# Patient Record
Sex: FEMALE | Race: WHITE | NOT HISPANIC OR LATINO | URBAN - METROPOLITAN AREA
[De-identification: names, ages, dates, MRNs, and addresses within clinical notes are randomized per-mention and may not be internally consistent; named-entity substitution may affect disease eponyms.]

---

## 2020-07-25 ENCOUNTER — TELEPHONE ENCOUNTER (OUTPATIENT)
Dept: URBAN - METROPOLITAN AREA CLINIC 13 | Facility: CLINIC | Age: 35
End: 2020-07-25

## 2020-07-25 RX ORDER — PANTOPRAZOLE SODIUM 40 MG
TAKE 1 TABLET BY MOUTH DAILY 30 MINUTES BEFORE BREAKFAST TABLET, DELAYED RELEASE (ENTERIC COATED) ORAL
Qty: 30 | Refills: 2 | OUTPATIENT
End: 2019-09-23

## 2020-07-25 RX ORDER — SERTRALINE HCL 25 MG
TAKE 1 TABLET DAILY TABLET ORAL
Refills: 0 | OUTPATIENT
End: 2016-03-04

## 2020-07-25 RX ORDER — LANSOPRAZOLE 30 MG/1
TAKE 1 CAPSULE DAILY PRN CAPSULE, DELAYED RELEASE ORAL
Refills: 0 | OUTPATIENT
End: 2016-03-04

## 2020-07-25 RX ORDER — ESOMEPRAZOLE MAGNESIUM 20 MG/1
TAKE 1 CAPSULE DAILY CAPSULE, DELAYED RELEASE ORAL
Refills: 0 | OUTPATIENT
End: 2015-09-10

## 2020-07-25 RX ORDER — CETIRIZINE HYDROCHLORIDE 10 MG/1
TAKE 1 CAPSULE DAILY CAPSULE, LIQUID FILLED ORAL
Refills: 0 | OUTPATIENT
End: 2016-03-04

## 2020-07-25 RX ORDER — FLUOXETINE HYDROCHLORIDE 20 MG/1
TAKE 1 TABLET DAILY TABLET ORAL
Refills: 0 | OUTPATIENT
End: 2018-08-17

## 2020-07-26 ENCOUNTER — TELEPHONE ENCOUNTER (OUTPATIENT)
Dept: URBAN - METROPOLITAN AREA CLINIC 13 | Facility: CLINIC | Age: 35
End: 2020-07-26

## 2020-07-26 RX ORDER — CEPHALEXIN 500 MG/1
CAPSULE ORAL
Qty: 28 | Refills: 0 | Status: ACTIVE | COMMUNITY
Start: 2019-09-04

## 2020-07-26 RX ORDER — AZITHROMYCIN DIHYDRATE 250 MG/1
TABLET, FILM COATED ORAL
Qty: 6 | Refills: 0 | Status: ACTIVE | COMMUNITY
Start: 2019-04-05

## 2020-07-26 RX ORDER — OSELTAMIVIR PHOSPHATE 75 MG/1
CAPSULE ORAL
Qty: 10 | Refills: 0 | Status: ACTIVE | COMMUNITY
Start: 2019-04-22

## 2020-07-26 RX ORDER — ALBUTEROL SULFATE 90 UG/1
AEROSOL, METERED RESPIRATORY (INHALATION)
Qty: 18 | Refills: 0 | Status: ACTIVE | COMMUNITY
Start: 2019-04-05

## 2020-07-26 RX ORDER — SERTRALINE 50 MG/1
TABLET, FILM COATED ORAL
Qty: 30 | Refills: 0 | Status: ACTIVE | COMMUNITY
Start: 2019-02-21

## 2020-07-26 RX ORDER — CEFDINIR 300 MG/1
CAPSULE ORAL
Qty: 20 | Refills: 0 | Status: ACTIVE | COMMUNITY
Start: 2018-12-11

## 2020-07-26 RX ORDER — ONDANSETRON 8 MG/1
TABLET ORAL
Qty: 15 | Refills: 0 | Status: ACTIVE | COMMUNITY
Start: 2019-04-22

## 2020-11-25 ENCOUNTER — WEB ENCOUNTER (OUTPATIENT)
Dept: URBAN - METROPOLITAN AREA CLINIC 113 | Facility: CLINIC | Age: 35
End: 2020-11-25

## 2020-11-25 ENCOUNTER — OFFICE VISIT (OUTPATIENT)
Dept: URBAN - METROPOLITAN AREA CLINIC 113 | Facility: CLINIC | Age: 35
End: 2020-11-25
Payer: COMMERCIAL

## 2020-11-25 VITALS
RESPIRATION RATE: 18 BRPM | BODY MASS INDEX: 32.65 KG/M2 | HEIGHT: 67 IN | TEMPERATURE: 98.1 F | DIASTOLIC BLOOD PRESSURE: 65 MMHG | HEART RATE: 75 BPM | SYSTOLIC BLOOD PRESSURE: 104 MMHG | WEIGHT: 208 LBS

## 2020-11-25 DIAGNOSIS — D51.0 PERNICIOUS ANEMIA: ICD-10-CM

## 2020-11-25 DIAGNOSIS — R10.11 RUQ PAIN: ICD-10-CM

## 2020-11-25 PROCEDURE — 99213 OFFICE O/P EST LOW 20 MIN: CPT | Performed by: INTERNAL MEDICINE

## 2020-11-25 PROCEDURE — G8427 DOCREV CUR MEDS BY ELIG CLIN: HCPCS | Performed by: INTERNAL MEDICINE

## 2020-11-25 RX ORDER — AZITHROMYCIN DIHYDRATE 250 MG/1
TABLET, FILM COATED ORAL
Qty: 6 | Refills: 0 | Status: DISCONTINUED | COMMUNITY
Start: 2019-04-05

## 2020-11-25 RX ORDER — CEFDINIR 300 MG/1
CAPSULE ORAL
Qty: 20 | Refills: 0 | Status: DISCONTINUED | COMMUNITY
Start: 2018-12-11

## 2020-11-25 RX ORDER — CETIRIZINE HYDROCHLORIDE 10 MG/1
1 TABLET TABLET, FILM COATED ORAL ONCE A DAY
Status: ACTIVE | COMMUNITY

## 2020-11-25 RX ORDER — OSELTAMIVIR PHOSPHATE 75 MG/1
CAPSULE ORAL
Qty: 10 | Refills: 0 | Status: DISCONTINUED | COMMUNITY
Start: 2019-04-22

## 2020-11-25 RX ORDER — SERTRALINE 50 MG/1
1 TABLET TABLET, FILM COATED ORAL ONCE A DAY
Refills: 0 | Status: DISCONTINUED | COMMUNITY
Start: 2019-02-21

## 2020-11-25 RX ORDER — ALBUTEROL SULFATE 90 UG/1
1 PUFF AS NEEDED AEROSOL, METERED RESPIRATORY (INHALATION)
Refills: 0 | Status: DISCONTINUED | COMMUNITY
Start: 2019-04-05

## 2020-11-25 RX ORDER — CEPHALEXIN 500 MG/1
CAPSULE ORAL
Qty: 28 | Refills: 0 | Status: DISCONTINUED | COMMUNITY
Start: 2019-09-04

## 2020-11-25 RX ORDER — ONDANSETRON 8 MG/1
TABLET ORAL
Qty: 15 | Refills: 0 | Status: DISCONTINUED | COMMUNITY
Start: 2019-04-22

## 2020-11-25 RX ORDER — MULTIVIT-MIN/FOLIC/VIT K/LYCOP 400-300MCG
1 TABLET TABLET ORAL ONCE A DAY
Status: ACTIVE | COMMUNITY

## 2020-11-25 NOTE — EXAM-PHYSICAL EXAM
She is alert and oriented to person place and situation no acute distress.  She has no scleral icterus. (4) no limitation

## 2020-11-25 NOTE — HPI-TODAY'S VISIT:
The patient is a very pleasant 35-year-old female who I seen in the past for abnormal LFTs and cholelithiasis.  She underwent cholecystectomy.  Work-up for abnormal LFTs did show an abnormal anti-smooth muscle antibody but her liver tests actually went to normal with weight loss on a keto diet.  Her last liver tests 1 year ago were normal.  She is now 21 weeks pregnant.  She states recently she started developing intermittent right upper quadrant pain primarily in a postprandial pattern.  It can be quite severe at times.  She is also worried as to whether she has developed severe B12 deficiency.  She has a long history of pernicious anemia with B12 deficiency.  She states she has not had a B12 shot in 1 year.  She states she started to have some of the nail changes and other physical changes that she has had with B12 deficiency in the past.

## 2020-12-08 ENCOUNTER — TELEPHONE ENCOUNTER (OUTPATIENT)
Dept: URBAN - METROPOLITAN AREA CLINIC 113 | Facility: CLINIC | Age: 35
End: 2020-12-08

## 2020-12-08 RX ORDER — CYANOCOBALAMIN 1000 UG/ML
1 ML INJECTION INTRAMUSCULAR; SUBCUTANEOUS WEEKLY
Qty: 6 | Refills: 3 | OUTPATIENT
Start: 2020-12-08 | End: 2021-12-03

## 2020-12-08 RX ORDER — SERTRALINE 50 MG/1
TABLET, FILM COATED ORAL
Qty: 30 | Refills: 0 | Status: ACTIVE | COMMUNITY
Start: 2019-02-21

## 2020-12-08 RX ORDER — OSELTAMIVIR PHOSPHATE 75 MG/1
CAPSULE ORAL
Qty: 10 | Refills: 0 | Status: ACTIVE | COMMUNITY
Start: 2019-04-22

## 2020-12-08 RX ORDER — ONDANSETRON 8 MG/1
TABLET ORAL
Qty: 15 | Refills: 0 | Status: ACTIVE | COMMUNITY
Start: 2019-04-22

## 2020-12-08 RX ORDER — ALBUTEROL SULFATE 90 UG/1
AEROSOL, METERED RESPIRATORY (INHALATION)
Qty: 18 | Refills: 0 | Status: ACTIVE | COMMUNITY
Start: 2019-04-05

## 2020-12-08 RX ORDER — AZITHROMYCIN DIHYDRATE 250 MG/1
TABLET, FILM COATED ORAL
Qty: 6 | Refills: 0 | Status: ACTIVE | COMMUNITY
Start: 2019-04-05

## 2020-12-08 RX ORDER — CEPHALEXIN 500 MG/1
CAPSULE ORAL
Qty: 28 | Refills: 0 | Status: ACTIVE | COMMUNITY
Start: 2019-09-04

## 2020-12-08 RX ORDER — CEFDINIR 300 MG/1
CAPSULE ORAL
Qty: 20 | Refills: 0 | Status: ACTIVE | COMMUNITY
Start: 2018-12-11

## 2021-02-02 ENCOUNTER — OFFICE VISIT (OUTPATIENT)
Dept: URBAN - METROPOLITAN AREA CLINIC 113 | Facility: CLINIC | Age: 36
End: 2021-02-02
Payer: COMMERCIAL

## 2021-02-02 VITALS
SYSTOLIC BLOOD PRESSURE: 97 MMHG | HEART RATE: 80 BPM | TEMPERATURE: 98 F | RESPIRATION RATE: 17 BRPM | BODY MASS INDEX: 36.26 KG/M2 | OXYGEN SATURATION: 98 % | HEIGHT: 67 IN | DIASTOLIC BLOOD PRESSURE: 68 MMHG | WEIGHT: 231 LBS

## 2021-02-02 DIAGNOSIS — D51.0 PERNICIOUS ANEMIA: ICD-10-CM

## 2021-02-02 DIAGNOSIS — R74.8 ABNORMAL AST AND ALT: ICD-10-CM

## 2021-02-02 DIAGNOSIS — K21.9 GASTROESOPHAGEAL REFLUX DISEASE, UNSPECIFIED WHETHER ESOPHAGITIS PRESENT: ICD-10-CM

## 2021-02-02 DIAGNOSIS — R10.11 RUQ PAIN: ICD-10-CM

## 2021-02-02 PROCEDURE — 99213 OFFICE O/P EST LOW 20 MIN: CPT | Performed by: INTERNAL MEDICINE

## 2021-02-02 PROCEDURE — G8427 DOCREV CUR MEDS BY ELIG CLIN: HCPCS | Performed by: INTERNAL MEDICINE

## 2021-02-02 RX ORDER — ALBUTEROL SULFATE 90 UG/1
AEROSOL, METERED RESPIRATORY (INHALATION)
Qty: 18 | Refills: 0 | Status: ACTIVE | COMMUNITY
Start: 2019-04-05

## 2021-02-02 RX ORDER — SERTRALINE 50 MG/1
TABLET, FILM COATED ORAL
Qty: 30 | Refills: 0 | Status: DISCONTINUED | COMMUNITY
Start: 2019-02-21

## 2021-02-02 RX ORDER — CETIRIZINE HYDROCHLORIDE 10 MG/1
1 TABLET TABLET, FILM COATED ORAL ONCE A DAY
Status: DISCONTINUED | COMMUNITY

## 2021-02-02 RX ORDER — ESCITALOPRAM OXALATE 10 MG/1
1 TABLET TABLET, FILM COATED ORAL ONCE A DAY
Status: ACTIVE | COMMUNITY

## 2021-02-02 RX ORDER — MULTIVIT-MIN/FOLIC/VIT K/LYCOP 400-300MCG
1 TABLET TABLET ORAL ONCE A DAY
Status: DISCONTINUED | COMMUNITY

## 2021-02-02 RX ORDER — CEFDINIR 300 MG/1
CAPSULE ORAL
Qty: 20 | Refills: 0 | Status: DISCONTINUED | COMMUNITY
Start: 2018-12-11

## 2021-02-02 RX ORDER — ONDANSETRON 8 MG/1
TABLET ORAL
Qty: 15 | Refills: 0 | Status: DISCONTINUED | COMMUNITY
Start: 2019-04-22

## 2021-02-02 RX ORDER — ESOMEPRAZOLE MAGNESIUM 20 MG/1
1 TABLET 1 HOUR BEFORE A MEAL TABLET ORAL ONCE A DAY
Status: ACTIVE | COMMUNITY

## 2021-02-02 RX ORDER — CYANOCOBALAMIN 1000 UG/ML
1 ML INJECTION INTRAMUSCULAR; SUBCUTANEOUS WEEKLY
Qty: 6 | Refills: 3 | Status: ACTIVE | COMMUNITY
Start: 2020-12-08 | End: 2021-12-03

## 2021-02-02 RX ORDER — OMEPRAZOLE 40 MG/1
1 CAPSULE 30 MINUTES BEFORE MORNING MEAL CAPSULE, DELAYED RELEASE ORAL ONCE A DAY
Qty: 90 | Refills: 3 | OUTPATIENT
Start: 2021-02-02

## 2021-02-02 RX ORDER — AZITHROMYCIN DIHYDRATE 250 MG/1
TABLET, FILM COATED ORAL
Qty: 6 | Refills: 0 | Status: DISCONTINUED | COMMUNITY
Start: 2019-04-05

## 2021-02-02 RX ORDER — CEPHALEXIN 500 MG/1
CAPSULE ORAL
Qty: 28 | Refills: 0 | Status: DISCONTINUED | COMMUNITY
Start: 2019-09-04

## 2021-02-02 RX ORDER — OSELTAMIVIR PHOSPHATE 75 MG/1
CAPSULE ORAL
Qty: 10 | Refills: 0 | Status: DISCONTINUED | COMMUNITY
Start: 2019-04-22

## 2021-02-02 NOTE — HPI-TODAY'S VISIT:
The patient is a very pleasant 35-year-old female who I seen in the past for abnormal LFTs and cholelithiasis.  She underwent cholecystectomy.  Work-up for abnormal LFTs did show an abnormal anti-smooth muscle antibody but her liver tests actually went to normal with weight loss on a keto diet.  Her last liver tests 1 year ago were normal.  She is now 21 weeks pregnant.  She states recently she started developing intermittent right upper quadrant pain primarily in a postprandial pattern.  It can be quite severe at times.  She is also worried as to whether she has developed severe B12 deficiency.  She has a long history of pernicious anemia with B12 deficiency.  She states she has not had a B12 shot in 1 year.  She states she started to have some of the nail changes and other physical changes that she has had with B12 deficiency in the past.  B12 level did come back markedly low at just over 200.  ALT was minimally elevated but other LFTs were normal.  She is receiving B12 shots and states her B12 level was recently checked and was approximately 350.  She states the right upper quadrant pain has indicated but she now has a probable waking up between midnight and 4 AM with severe heartburn and regurgitation all the way to the back of her throat often causing coughing.  There is severe sour taste to it.  She started over-the-counter Nexium and this has provided significant improvement but she still has some symptoms.  She states her obstetrician recently checked LFTs and they were normal.

## 2021-02-02 NOTE — EXAM-AA
she is alert and oriented to person place and situation in no acute distress.  She has no scleral icterus.  She is 31 weeks pregnant.

## 2023-03-10 ENCOUNTER — TELEPHONE ENCOUNTER (OUTPATIENT)
Dept: URBAN - METROPOLITAN AREA CLINIC 113 | Facility: CLINIC | Age: 38
End: 2023-03-10

## 2023-03-10 ENCOUNTER — OFFICE VISIT (OUTPATIENT)
Dept: URBAN - METROPOLITAN AREA CLINIC 107 | Facility: CLINIC | Age: 38
End: 2023-03-10
Payer: COMMERCIAL

## 2023-03-10 VITALS
DIASTOLIC BLOOD PRESSURE: 69 MMHG | HEIGHT: 67 IN | TEMPERATURE: 96.6 F | SYSTOLIC BLOOD PRESSURE: 101 MMHG | WEIGHT: 268 LBS | HEART RATE: 80 BPM | BODY MASS INDEX: 42.06 KG/M2

## 2023-03-10 DIAGNOSIS — R74.8 ABNORMAL AST AND ALT: ICD-10-CM

## 2023-03-10 DIAGNOSIS — K21.9 GASTROESOPHAGEAL REFLUX DISEASE, UNSPECIFIED WHETHER ESOPHAGITIS PRESENT: ICD-10-CM

## 2023-03-10 DIAGNOSIS — E53.8 B12 DEFICIENCY: ICD-10-CM

## 2023-03-10 DIAGNOSIS — D51.0 PERNICIOUS ANEMIA: ICD-10-CM

## 2023-03-10 DIAGNOSIS — R10.11 RUQ PAIN: ICD-10-CM

## 2023-03-10 PROBLEM — 84027009: Status: ACTIVE | Noted: 2020-11-25

## 2023-03-10 PROCEDURE — 99214 OFFICE O/P EST MOD 30 MIN: CPT | Performed by: INTERNAL MEDICINE

## 2023-03-10 RX ORDER — CYANOCOBALAMIN 1000 UG/ML
1 ML INJECTION INTRAMUSCULAR; SUBCUTANEOUS
Qty: 3 | Refills: 3 | OUTPATIENT
Start: 2023-03-14 | End: 2024-03-07

## 2023-03-10 RX ORDER — LEVOTHYROXINE SODIUM 25 UG/1
1 TABLET IN THE MORNING ON AN EMPTY STOMACH TABLET ORAL ONCE A DAY
Status: ACTIVE | COMMUNITY

## 2023-03-10 RX ORDER — ESCITALOPRAM OXALATE 20 MG/1
1 AND 1/2 PILLS TABLET, FILM COATED ORAL ONCE A DAY
Status: ACTIVE | COMMUNITY

## 2023-03-10 NOTE — HPI-TODAY'S VISIT:
The patient is a very pleasant 35-year-old female who I seen in the past for abnormal LFTs and cholelithiasis.  She underwent cholecystectomy.  Work-up for abnormal LFTs did show an abnormal anti-smooth muscle antibody but her liver tests actually went to normal with weight loss on a keto diet.  Her last liver tests 1 year ago were normal.  She is now 21 weeks pregnant.  She states recently she started developing intermittent right upper quadrant pain primarily in a postprandial pattern.  It can be quite severe at times.  She is also worried as to whether she has developed severe B12 deficiency.  She has a long history of pernicious anemia with B12 deficiency.  She states she has not had a B12 shot in 1 year.  She states she started to have some of the nail changes and other physical changes that she has had with B12 deficiency in the past.  B12 level did come back markedly low at just over 200.  ALT was minimally elevated but other LFTs were normal.  She is receiving B12 shots and states her B12 level was recently checked and was approximately 350.  She states the right upper quadrant pain has indicated but she now has a probable waking up between midnight and 4 AM with severe heartburn and regurgitation all the way to the back of her throat often causing coughing.  There is severe sour taste to it.  She started over-the-counter Nexium and this has provided significant improvement but she still has some symptoms.  She states her obstetrician recently checked LFTs and they were normal. Interval history, 3/10/2023.  Referring records were reviewed.  Laboratory testing from  of this month revealed a normal CBC.  CMP was also normal.  Of note back in August she did have a low hemoglobin of 8.9. Last upper endoscopy was 2016 for follow-up of gastric polyps.  This was an otherwise unremarkable exam.  Biopsies at that time were unremarkable with no evidence for H. pylori. 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  In the interim she became pregnant again and now has a young child.  She states the reason she was in the emergency room the other day she has been evaluated for episodes of syncope that tend to come on during stress.  She has no gastrointestinal complaints at this time.  She states she has been off B12 shots for a year.  She states most recent B12 level was down to 500.

## 2024-01-11 ENCOUNTER — OFFICE VISIT (OUTPATIENT)
Dept: URBAN - METROPOLITAN AREA CLINIC 113 | Facility: CLINIC | Age: 39
End: 2024-01-11

## 2024-03-20 ENCOUNTER — OV EP (OUTPATIENT)
Dept: URBAN - METROPOLITAN AREA CLINIC 107 | Facility: CLINIC | Age: 39
End: 2024-03-20
Payer: COMMERCIAL

## 2024-03-20 VITALS
DIASTOLIC BLOOD PRESSURE: 81 MMHG | TEMPERATURE: 97 F | BODY MASS INDEX: 36.26 KG/M2 | WEIGHT: 231 LBS | SYSTOLIC BLOOD PRESSURE: 115 MMHG | HEART RATE: 89 BPM | HEIGHT: 67 IN

## 2024-03-20 DIAGNOSIS — R74.8 ABNORMAL AST AND ALT: ICD-10-CM

## 2024-03-20 DIAGNOSIS — K21.9 GASTROESOPHAGEAL REFLUX DISEASE, UNSPECIFIED WHETHER ESOPHAGITIS PRESENT: ICD-10-CM

## 2024-03-20 DIAGNOSIS — R10.11 RUQ PAIN: ICD-10-CM

## 2024-03-20 DIAGNOSIS — E53.8 B12 DEFICIENCY: ICD-10-CM

## 2024-03-20 DIAGNOSIS — D51.0 PERNICIOUS ANEMIA: ICD-10-CM

## 2024-03-20 PROCEDURE — 99214 OFFICE O/P EST MOD 30 MIN: CPT | Performed by: INTERNAL MEDICINE

## 2024-03-20 RX ORDER — DEXTROAMPHETAMINE SACCHARATE, AMPHETAMINE ASPARTATE MONOHYDRATE, DEXTROAMPHETAMINE SULFATE, AND AMPHETAMINE SULFATE 5; 5; 5; 5 MG/1; MG/1; MG/1; MG/1
CAPSULE, EXTENDED RELEASE ORAL
Qty: 60 CAPSULE | Status: ACTIVE | COMMUNITY

## 2024-03-20 RX ORDER — ATENOLOL 25 MG/1
TABLET ORAL
Qty: 90 EACH | Refills: 3 | Status: ACTIVE | COMMUNITY

## 2024-03-20 RX ORDER — LEVOTHYROXINE SODIUM 25 UG/1
1 TABLET IN THE MORNING ON AN EMPTY STOMACH TABLET ORAL ONCE A DAY
Status: ACTIVE | COMMUNITY

## 2024-03-20 RX ORDER — DEXTROAMPHETAMINE SULFATE, DEXTROAMPHETAMINE SACCHARATE, AMPHETAMINE SULFATE AND AMPHETAMINE ASPARTATE 7.5; 7.5; 7.5; 7.5 MG/1; MG/1; MG/1; MG/1
CAPSULE, EXTENDED RELEASE ORAL
Qty: 30 CAPSULE | Status: ACTIVE | COMMUNITY

## 2024-03-20 RX ORDER — OMEPRAZOLE 40 MG/1
1 CAPSULE 30 MINUTES BEFORE MORNING MEAL CAPSULE, DELAYED RELEASE ORAL ONCE A DAY
Qty: 90 | Refills: 3 | OUTPATIENT
Start: 2024-03-20

## 2024-03-20 RX ORDER — AMITRIPTYLINE HYDROCHLORIDE 25 MG/1
1 TABLET AT BEDTIME TABLET, FILM COATED ORAL ONCE A DAY
Qty: 30 TABLET | Refills: 4 | OUTPATIENT
Start: 2024-03-20

## 2024-03-20 RX ORDER — CLONAZEPAM 0.5 MG/1
TABLET ORAL
Qty: 30 TABLET | Status: ACTIVE | COMMUNITY

## 2024-03-20 RX ORDER — ESCITALOPRAM OXALATE 20 MG/1
1 AND 1/2 PILLS TABLET, FILM COATED ORAL ONCE A DAY
Status: ON HOLD | COMMUNITY

## 2024-03-20 RX ORDER — ONDANSETRON HYDROCHLORIDE 4 MG/1
TABLET, FILM COATED ORAL
Qty: 20 EACH | Refills: 0 | Status: ACTIVE | COMMUNITY

## 2024-03-20 RX ORDER — IBUPROFEN 800 MG/1
TABLET ORAL
Qty: 30 TABLET | Status: ACTIVE | COMMUNITY

## 2024-03-20 RX ORDER — DICYCLOMINE HYDROCHLORIDE 10 MG/1
CAPSULE ORAL
Qty: 20 CAPSULE | Status: ACTIVE | COMMUNITY

## 2024-03-20 NOTE — HPI-TODAY'S VISIT:
The patient is a very pleasant 35-year-old female who I seen in the past for abnormal LFTs and cholelithiasis.  She underwent cholecystectomy.  Work-up for abnormal LFTs did show an abnormal anti-smooth muscle antibody but her liver tests actually went to normal with weight loss on a keto diet.  Her last liver tests 1 year ago were normal.  She is now 21 weeks pregnant.  She states recently she started developing intermittent right upper quadrant pain primarily in a postprandial pattern.  It can be quite severe at times.  She is also worried as to whether she has developed severe B12 deficiency.  She has a long history of pernicious anemia with B12 deficiency.  She states she has not had a B12 shot in 1 year.  She states she started to have some of the nail changes and other physical changes that she has had with B12 deficiency in the past.  B12 level did come back markedly low at just over 200.  ALT was minimally elevated but other LFTs were normal.  She is receiving B12 shots and states her B12 level was recently checked and was approximately 350.  She states the right upper quadrant pain has indicated but she now has a probable waking up between midnight and 4 AM with severe heartburn and regurgitation all the way to the back of her throat often causing coughing.  There is severe sour taste to it.  She started over-the-counter Nexium and this has provided significant improvement but she still has some symptoms.  She states her obstetrician recently checked LFTs and they were normal. Interval history, 3/10/2023.  Referring records were reviewed.  Laboratory testing from  of this month revealed a normal CBC.  CMP was also normal.  Of note back in August she did have a low hemoglobin of 8.9. Last upper endoscopy was 2016 for follow-up of gastric polyps.  This was an otherwise unremarkable exam.  Biopsies at that time were unremarkable with no evidence for H. pylori. 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  In the interim she became pregnant again and now has a young child.  She states the reason she was in the emergency room the other day she has been evaluated for episodes of syncope that tend to come on during stress.  She has no gastrointestinal complaints at this time.  She states she has been off B12 shots for a year.  She states most recent B12 level was down to 500. Interval history, 3/20/2024.  Laboratory testing from 2023 revealed a normal intrinsic factor antibody level. The patient states that since I last saw her she was started on a GLP-1 blocker and this led to severe gastrointestinal complaints.  She states also her psychiatrist has been working on changing her medications and in fact put her through a medication holiday.  She is thought to have PTSD related to the death of her second child.  She admits that she has been noncompliant with fiber and with her B12 shots. She states she was recently in the [facility] ER for the symptomatologies and had blood work and a CT scan performed which we will obtain those results.

## 2024-06-03 ENCOUNTER — DASHBOARD ENCOUNTERS (OUTPATIENT)
Age: 39
End: 2024-06-03

## 2024-06-03 ENCOUNTER — OFFICE VISIT (OUTPATIENT)
Dept: URBAN - METROPOLITAN AREA CLINIC 107 | Facility: CLINIC | Age: 39
End: 2024-06-03
Payer: COMMERCIAL

## 2024-06-03 VITALS
HEART RATE: 69 BPM | TEMPERATURE: 98.1 F | BODY MASS INDEX: 36.76 KG/M2 | DIASTOLIC BLOOD PRESSURE: 76 MMHG | SYSTOLIC BLOOD PRESSURE: 98 MMHG | HEIGHT: 67 IN | WEIGHT: 234.2 LBS

## 2024-06-03 DIAGNOSIS — E53.8 B12 DEFICIENCY: ICD-10-CM

## 2024-06-03 DIAGNOSIS — R10.11 RUQ PAIN: ICD-10-CM

## 2024-06-03 DIAGNOSIS — R74.8 ABNORMAL AST AND ALT: ICD-10-CM

## 2024-06-03 DIAGNOSIS — K58.1 IRRITABLE BOWEL SYNDROME WITH CONSTIPATION: ICD-10-CM

## 2024-06-03 DIAGNOSIS — K21.9 GASTROESOPHAGEAL REFLUX DISEASE, UNSPECIFIED WHETHER ESOPHAGITIS PRESENT: ICD-10-CM

## 2024-06-03 DIAGNOSIS — D51.0 PERNICIOUS ANEMIA: ICD-10-CM

## 2024-06-03 PROBLEM — 440630006: Status: ACTIVE | Noted: 2024-06-03

## 2024-06-03 PROCEDURE — 99214 OFFICE O/P EST MOD 30 MIN: CPT | Performed by: INTERNAL MEDICINE

## 2024-06-03 RX ORDER — CLONAZEPAM 0.5 MG/1
TABLET ORAL
Qty: 30 TABLET | Status: ACTIVE | COMMUNITY

## 2024-06-03 RX ORDER — IBUPROFEN 800 MG/1
TABLET ORAL
Qty: 30 TABLET | Status: ACTIVE | COMMUNITY

## 2024-06-03 RX ORDER — ATENOLOL 25 MG/1
TABLET ORAL
Qty: 90 EACH | Refills: 3 | Status: ACTIVE | COMMUNITY

## 2024-06-03 RX ORDER — OMEPRAZOLE 40 MG/1
1 CAPSULE 30 MINUTES BEFORE MORNING MEAL CAPSULE, DELAYED RELEASE ORAL ONCE A DAY
Qty: 90 | Refills: 3 | Status: ACTIVE | COMMUNITY
Start: 2024-03-20

## 2024-06-03 RX ORDER — AMITRIPTYLINE HYDROCHLORIDE 25 MG/1
1 TABLET AT BEDTIME TABLET, FILM COATED ORAL ONCE A DAY
Qty: 30 TABLET | Refills: 4 | Status: ACTIVE | COMMUNITY
Start: 2024-03-20

## 2024-06-03 RX ORDER — ONDANSETRON HYDROCHLORIDE 4 MG/1
TABLET, FILM COATED ORAL
Qty: 20 EACH | Refills: 0 | Status: ON HOLD | COMMUNITY

## 2024-06-03 RX ORDER — DEXTROAMPHETAMINE SACCHARATE, AMPHETAMINE ASPARTATE MONOHYDRATE, DEXTROAMPHETAMINE SULFATE, AND AMPHETAMINE SULFATE 5; 5; 5; 5 MG/1; MG/1; MG/1; MG/1
CAPSULE, EXTENDED RELEASE ORAL
Qty: 60 CAPSULE | Status: ON HOLD | COMMUNITY

## 2024-06-03 RX ORDER — LEVOTHYROXINE SODIUM 25 UG/1
1 TABLET IN THE MORNING ON AN EMPTY STOMACH TABLET ORAL ONCE A DAY
Status: ACTIVE | COMMUNITY

## 2024-06-03 RX ORDER — DEXTROAMPHETAMINE SULFATE, DEXTROAMPHETAMINE SACCHARATE, AMPHETAMINE SULFATE AND AMPHETAMINE ASPARTATE 7.5; 7.5; 7.5; 7.5 MG/1; MG/1; MG/1; MG/1
CAPSULE, EXTENDED RELEASE ORAL
Qty: 30 CAPSULE | Status: ACTIVE | COMMUNITY

## 2024-06-03 RX ORDER — PLECANATIDE 3 MG/1
1 TABLET TABLET ORAL ONCE A DAY
Qty: 90 TABLET | Refills: 3 | OUTPATIENT
Start: 2024-06-03 | End: 2025-05-29

## 2024-06-03 RX ORDER — ESCITALOPRAM OXALATE 20 MG/1
1 AND 1/2 PILLS TABLET, FILM COATED ORAL ONCE A DAY
Status: ON HOLD | COMMUNITY

## 2024-06-03 RX ORDER — DICYCLOMINE HYDROCHLORIDE 10 MG/1
CAPSULE ORAL
Qty: 20 CAPSULE | Status: ON HOLD | COMMUNITY

## 2024-06-10 ENCOUNTER — TELEPHONE ENCOUNTER (OUTPATIENT)
Dept: URBAN - METROPOLITAN AREA CLINIC 107 | Facility: CLINIC | Age: 39
End: 2024-06-10

## 2024-06-10 RX ORDER — LUBIPROSTONE 24 UG/1
1 CAPSULE CAPSULE, GELATIN COATED ORAL TWICE A DAY
Qty: 180 CAPSULE | Refills: 3 | OUTPATIENT
Start: 2024-06-25 | End: 2025-06-20

## 2024-06-10 RX ORDER — LINACLOTIDE 145 UG/1
1 CAPSULE AT LEAST 30 MINUTES BEFORE THE FIRST MEAL OF THE DAY ON AN EMPTY STOMACH CAPSULE, GELATIN COATED ORAL ONCE A DAY
Qty: 90 | Refills: 0 | OUTPATIENT
Start: 2024-06-19 | End: 2024-09-17

## 2024-06-27 ENCOUNTER — TELEPHONE ENCOUNTER (OUTPATIENT)
Dept: URBAN - METROPOLITAN AREA CLINIC 113 | Facility: CLINIC | Age: 39
End: 2024-06-27

## 2024-06-27 RX ORDER — LINACLOTIDE 145 UG/1
1 CAPSULE AT LEAST 30 MINUTES BEFORE THE FIRST MEAL OF THE DAY ON AN EMPTY STOMACH CAPSULE, GELATIN COATED ORAL ONCE A DAY
Qty: 90 | Refills: 0
Start: 2024-06-19 | End: 2024-09-25

## 2024-07-01 ENCOUNTER — TELEPHONE ENCOUNTER (OUTPATIENT)
Dept: URBAN - METROPOLITAN AREA CLINIC 113 | Facility: CLINIC | Age: 39
End: 2024-07-01

## 2024-07-08 ENCOUNTER — TELEPHONE ENCOUNTER (OUTPATIENT)
Dept: URBAN - METROPOLITAN AREA CLINIC 113 | Facility: CLINIC | Age: 39
End: 2024-07-08

## 2024-08-31 ENCOUNTER — ERX REFILL RESPONSE (OUTPATIENT)
Dept: URBAN - METROPOLITAN AREA CLINIC 113 | Facility: CLINIC | Age: 39
End: 2024-08-31

## 2024-08-31 RX ORDER — AMITRIPTYLINE HYDROCHLORIDE 25 MG/1
TAKE 1 TABLET BY MOUTH DAILY AT BEDTIME TABLET, FILM COATED ORAL
Qty: 30 TABLET | Refills: 8 | OUTPATIENT

## 2024-08-31 RX ORDER — AMITRIPTYLINE HYDROCHLORIDE 25 MG/1
1 TABLET AT BEDTIME TABLET, FILM COATED ORAL ONCE A DAY
Qty: 30 TABLET | Refills: 4 | OUTPATIENT

## 2024-11-01 ENCOUNTER — OFFICE VISIT (OUTPATIENT)
Dept: URBAN - METROPOLITAN AREA CLINIC 107 | Facility: CLINIC | Age: 39
End: 2024-11-01
Payer: COMMERCIAL

## 2024-11-01 ENCOUNTER — LAB OUTSIDE AN ENCOUNTER (OUTPATIENT)
Dept: URBAN - METROPOLITAN AREA CLINIC 107 | Facility: CLINIC | Age: 39
End: 2024-11-01

## 2024-11-01 VITALS
HEART RATE: 75 BPM | WEIGHT: 229.2 LBS | TEMPERATURE: 97.9 F | BODY MASS INDEX: 35.97 KG/M2 | HEIGHT: 67 IN | SYSTOLIC BLOOD PRESSURE: 118 MMHG | DIASTOLIC BLOOD PRESSURE: 80 MMHG

## 2024-11-01 DIAGNOSIS — R10.11 RUQ PAIN: ICD-10-CM

## 2024-11-01 DIAGNOSIS — E53.8 B12 DEFICIENCY: ICD-10-CM

## 2024-11-01 DIAGNOSIS — K21.9 GASTROESOPHAGEAL REFLUX DISEASE, UNSPECIFIED WHETHER ESOPHAGITIS PRESENT: ICD-10-CM

## 2024-11-01 DIAGNOSIS — K58.1 IRRITABLE BOWEL SYNDROME WITH CONSTIPATION: ICD-10-CM

## 2024-11-01 DIAGNOSIS — D51.0 PERNICIOUS ANEMIA: ICD-10-CM

## 2024-11-01 DIAGNOSIS — R19.4 CHANGE IN BOWEL HABITS: ICD-10-CM

## 2024-11-01 DIAGNOSIS — R74.8 ABNORMAL AST AND ALT: ICD-10-CM

## 2024-11-01 PROCEDURE — 99214 OFFICE O/P EST MOD 30 MIN: CPT | Performed by: INTERNAL MEDICINE

## 2024-11-01 RX ORDER — PLECANATIDE 3 MG/1
1 TABLET TABLET ORAL ONCE A DAY
Qty: 90 TABLET | Refills: 3 | Status: ACTIVE | COMMUNITY
Start: 2024-06-03 | End: 2025-05-29

## 2024-11-01 RX ORDER — AMITRIPTYLINE HYDROCHLORIDE 25 MG/1
TAKE 1 TABLET BY MOUTH DAILY AT BEDTIME TABLET, FILM COATED ORAL
Qty: 30 TABLET | Refills: 8 | Status: ACTIVE | COMMUNITY

## 2024-11-01 RX ORDER — CLONAZEPAM 0.5 MG/1
TABLET ORAL
Qty: 30 TABLET | Status: ACTIVE | COMMUNITY

## 2024-11-01 RX ORDER — DEXTROAMPHETAMINE SULFATE, DEXTROAMPHETAMINE SACCHARATE, AMPHETAMINE SULFATE AND AMPHETAMINE ASPARTATE 7.5; 7.5; 7.5; 7.5 MG/1; MG/1; MG/1; MG/1
CAPSULE, EXTENDED RELEASE ORAL
Qty: 30 CAPSULE | Status: ACTIVE | COMMUNITY

## 2024-11-01 RX ORDER — ONDANSETRON HYDROCHLORIDE 4 MG/1
TABLET, FILM COATED ORAL
Qty: 20 EACH | Refills: 0 | Status: ON HOLD | COMMUNITY

## 2024-11-01 RX ORDER — LEVOTHYROXINE SODIUM 25 UG/1
1 TABLET IN THE MORNING ON AN EMPTY STOMACH TABLET ORAL ONCE A DAY
Status: ON HOLD | COMMUNITY

## 2024-11-01 RX ORDER — DEXTROAMPHETAMINE SACCHARATE, AMPHETAMINE ASPARTATE MONOHYDRATE, DEXTROAMPHETAMINE SULFATE, AND AMPHETAMINE SULFATE 5; 5; 5; 5 MG/1; MG/1; MG/1; MG/1
CAPSULE, EXTENDED RELEASE ORAL
Qty: 60 CAPSULE | Status: ON HOLD | COMMUNITY

## 2024-11-01 RX ORDER — ATENOLOL 25 MG/1
TABLET ORAL
Qty: 90 EACH | Refills: 3 | Status: ACTIVE | COMMUNITY

## 2024-11-01 RX ORDER — ESCITALOPRAM OXALATE 20 MG/1
1 AND 1/2 PILLS TABLET, FILM COATED ORAL ONCE A DAY
Status: ON HOLD | COMMUNITY

## 2024-11-01 RX ORDER — LUBIPROSTONE 24 UG/1
1 CAPSULE CAPSULE, GELATIN COATED ORAL TWICE A DAY
Qty: 180 CAPSULE | Refills: 3 | Status: ACTIVE | COMMUNITY
Start: 2024-06-25 | End: 2025-06-20

## 2024-11-01 RX ORDER — DICYCLOMINE HYDROCHLORIDE 10 MG/1
CAPSULE ORAL
Qty: 20 CAPSULE | Status: ON HOLD | COMMUNITY

## 2024-11-01 RX ORDER — OMEPRAZOLE 20 MG/1
2 CAPSULE 30 MINUTES BEFORE MORNING MEAL CAPSULE, DELAYED RELEASE ORAL ONCE A DAY
Refills: 3 | Status: ACTIVE | COMMUNITY
Start: 2024-03-20

## 2024-11-01 RX ORDER — LEVOTHYROXINE, LIOTHYRONINE 19; 4.5 UG/1; UG/1
1 TABLET ON AN EMPTY STOMACH TABLET ORAL ONCE A DAY
Status: ACTIVE | COMMUNITY

## 2024-11-01 RX ORDER — IBUPROFEN 800 MG/1
TABLET ORAL
Qty: 30 TABLET | Status: ACTIVE | COMMUNITY

## 2024-11-01 NOTE — HPI-TODAY'S VISIT:
The patient is a very pleasant 35-year-old female who I seen in the past for abnormal LFTs and cholelithiasis.  She underwent cholecystectomy.  Work-up for abnormal LFTs did show an abnormal anti-smooth muscle antibody but her liver tests actually went to normal with weight loss on a keto diet.  Her last liver tests 1 year ago were normal.  She is now 21 weeks pregnant.  She states recently she started developing intermittent right upper quadrant pain primarily in a postprandial pattern.  It can be quite severe at times.  She is also worried as to whether she has developed severe B12 deficiency.  She has a long history of pernicious anemia with B12 deficiency.  She states she has not had a B12 shot in 1 year.  She states she started to have some of the nail changes and other physical changes that she has had with B12 deficiency in the past.  B12 level did come back markedly low at just over 200.  ALT was minimally elevated but other LFTs were normal.  She is receiving B12 shots and states her B12 level was recently checked and was approximately 350.  She states the right upper quadrant pain has indicated but she now has a probable waking up between midnight and 4 AM with severe heartburn and regurgitation all the way to the back of her throat often causing coughing.  There is severe sour taste to it.  She started over-the-counter Nexium and this has provided significant improvement but she still has some symptoms.  She states her obstetrician recently checked LFTs and they were normal. Interval history, 3/10/2023.  Referring records were reviewed.  Laboratory testing from  of this month revealed a normal CBC.  CMP was also normal.  Of note back in August she did have a low hemoglobin of 8.9. Last upper endoscopy was 2016 for follow-up of gastric polyps.  This was an otherwise unremarkable exam.  Biopsies at that time were unremarkable with no evidence for H. pylori. 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  In the interim she became pregnant again and now has a young child.  She states the reason she was in the emergency room the other day she has been evaluated for episodes of syncope that tend to come on during stress.  She has no gastrointestinal complaints at this time.  She states she has been off B12 shots for a year.  She states most recent B12 level was down to 500. Interval history, 3/20/2024.  Laboratory testing from 2023 revealed a normal intrinsic factor antibody level. The patient states that since I last saw her she was started on a GLP-1 blocker and this led to severe gastrointestinal complaints.  She states also her psychiatrist has been working on changing her medications and in fact put her through a medication holiday.  She is thought to have PTSD related to the death of her second child.  She admits that she has been noncompliant with fiber and with her B12 shots. She states she was recently in the [facility] ER for the symptomatologies and had blood work and a CT scan performed which we will obtain those results. Interval history, 6/3/2024.  Outside emergency room records from February were reviewed.  This revealed a normal CBC, normal CMP, CT scan of the abdomen and pelvis with contrast which was unremarkable. The patient states that the amitriptyline was only minimally effective.  She still continues to have a pattern of bloating developing a sense of constipation with no bowel movements and then a "blowout" with multiple bowel movements that are quite muddy.  She admits that she has been somewhat noncompliant with her B12 shots.  She has a couple still left at home.  She states she still continues to have severe problems of anxiety depression and PTSD issues with fighter flight issues.  She is working with her psychiatrist closely to try and improve the symptomatology. Interval history, 2024. The patient continues to have the symptoms of erratic bowel movements.  She does state that she has fear of having bowel movements at school if she does not wish to use the children's bathrooms.  She is taking a fiber supplement with some marginal improvement at best.

## 2024-11-14 ENCOUNTER — TELEPHONE ENCOUNTER (OUTPATIENT)
Dept: URBAN - METROPOLITAN AREA CLINIC 107 | Facility: CLINIC | Age: 39
End: 2024-11-14

## 2024-11-25 ENCOUNTER — OFFICE VISIT (OUTPATIENT)
Dept: URBAN - METROPOLITAN AREA MEDICAL CENTER 2 | Facility: MEDICAL CENTER | Age: 39
End: 2024-11-25

## 2024-12-10 ENCOUNTER — TELEPHONE ENCOUNTER (OUTPATIENT)
Dept: URBAN - METROPOLITAN AREA CLINIC 107 | Facility: CLINIC | Age: 39
End: 2024-12-10

## 2024-12-16 ENCOUNTER — OFFICE VISIT (OUTPATIENT)
Dept: URBAN - METROPOLITAN AREA SURGERY CENTER 25 | Facility: SURGERY CENTER | Age: 39
End: 2024-12-16
Payer: COMMERCIAL

## 2024-12-16 DIAGNOSIS — R19.4 CHANGE IN BOWEL HABITS: ICD-10-CM

## 2024-12-16 DIAGNOSIS — K63.89 OTHER SPECIFIED DISEASES OF INTESTINE: ICD-10-CM

## 2024-12-16 DIAGNOSIS — R19.4 CHANGE IN BOWEL HABIT: ICD-10-CM

## 2024-12-16 PROCEDURE — 45378 DIAGNOSTIC COLONOSCOPY: CPT | Performed by: INTERNAL MEDICINE

## 2024-12-16 PROCEDURE — 00811 ANES LWR INTST NDSC NOS: CPT | Performed by: ANESTHESIOLOGY

## 2024-12-16 PROCEDURE — 00811 ANES LWR INTST NDSC NOS: CPT | Performed by: NURSE ANESTHETIST, CERTIFIED REGISTERED

## 2024-12-16 RX ORDER — LUBIPROSTONE 24 UG/1
1 CAPSULE CAPSULE, GELATIN COATED ORAL TWICE A DAY
Qty: 180 CAPSULE | Refills: 3 | Status: ACTIVE | COMMUNITY
Start: 2024-06-25 | End: 2025-06-20

## 2024-12-16 RX ORDER — IBUPROFEN 800 MG/1
TABLET ORAL
Qty: 30 TABLET | Status: ACTIVE | COMMUNITY

## 2024-12-16 RX ORDER — ATENOLOL 25 MG/1
TABLET ORAL
Qty: 90 EACH | Refills: 3 | Status: ACTIVE | COMMUNITY

## 2024-12-16 RX ORDER — DICYCLOMINE HYDROCHLORIDE 10 MG/1
CAPSULE ORAL
Qty: 20 CAPSULE | Status: ON HOLD | COMMUNITY

## 2024-12-16 RX ORDER — OMEPRAZOLE 20 MG/1
2 CAPSULE 30 MINUTES BEFORE MORNING MEAL CAPSULE, DELAYED RELEASE ORAL ONCE A DAY
Refills: 3 | Status: ACTIVE | COMMUNITY
Start: 2024-03-20

## 2024-12-16 RX ORDER — AMITRIPTYLINE HYDROCHLORIDE 25 MG/1
TAKE 1 TABLET BY MOUTH DAILY AT BEDTIME TABLET, FILM COATED ORAL
Qty: 30 TABLET | Refills: 8 | Status: ACTIVE | COMMUNITY

## 2024-12-16 RX ORDER — PLECANATIDE 3 MG/1
1 TABLET TABLET ORAL ONCE A DAY
Qty: 90 TABLET | Refills: 3 | Status: ACTIVE | COMMUNITY
Start: 2024-06-03 | End: 2025-05-29

## 2024-12-16 RX ORDER — CLONAZEPAM 0.5 MG/1
TABLET ORAL
Qty: 30 TABLET | Status: ACTIVE | COMMUNITY

## 2024-12-16 RX ORDER — ONDANSETRON HYDROCHLORIDE 4 MG/1
TABLET, FILM COATED ORAL
Qty: 20 EACH | Refills: 0 | Status: ON HOLD | COMMUNITY

## 2024-12-16 RX ORDER — DEXTROAMPHETAMINE SACCHARATE, AMPHETAMINE ASPARTATE MONOHYDRATE, DEXTROAMPHETAMINE SULFATE, AND AMPHETAMINE SULFATE 5; 5; 5; 5 MG/1; MG/1; MG/1; MG/1
CAPSULE, EXTENDED RELEASE ORAL
Qty: 60 CAPSULE | Status: ON HOLD | COMMUNITY

## 2024-12-16 RX ORDER — LEVOTHYROXINE, LIOTHYRONINE 19; 4.5 UG/1; UG/1
1 TABLET ON AN EMPTY STOMACH TABLET ORAL ONCE A DAY
Status: ACTIVE | COMMUNITY

## 2024-12-16 RX ORDER — DEXTROAMPHETAMINE SULFATE, DEXTROAMPHETAMINE SACCHARATE, AMPHETAMINE SULFATE AND AMPHETAMINE ASPARTATE 7.5; 7.5; 7.5; 7.5 MG/1; MG/1; MG/1; MG/1
CAPSULE, EXTENDED RELEASE ORAL
Qty: 30 CAPSULE | Status: ACTIVE | COMMUNITY

## 2024-12-16 RX ORDER — LEVOTHYROXINE SODIUM 25 UG/1
1 TABLET IN THE MORNING ON AN EMPTY STOMACH TABLET ORAL ONCE A DAY
Status: ON HOLD | COMMUNITY

## 2024-12-16 RX ORDER — ESCITALOPRAM OXALATE 20 MG/1
1 AND 1/2 PILLS TABLET, FILM COATED ORAL ONCE A DAY
Status: ON HOLD | COMMUNITY

## 2025-01-13 ENCOUNTER — ERX REFILL RESPONSE (OUTPATIENT)
Dept: URBAN - METROPOLITAN AREA CLINIC 107 | Facility: CLINIC | Age: 40
End: 2025-01-13

## 2025-01-13 RX ORDER — LINACLOTIDE 145 UG/1
TAKE 1 CAPSULE BY MOUTH DAILY ATLEAST 30 MINUTES BEFORE FIRST MEAL OF THE DAY ON AN EMPTY STOMACH AS DIRECTED CAPSULE, GELATIN COATED ORAL
Qty: 90 CAPSULE | Refills: 0 | OUTPATIENT

## 2025-01-13 RX ORDER — LINACLOTIDE 145 UG/1
1 CAPSULE AT LEAST 30 MINUTES BEFORE THE FIRST MEAL OF THE DAY ON AN EMPTY STOMACH CAPSULE, GELATIN COATED ORAL ONCE A DAY
Qty: 90 | Refills: 3 | OUTPATIENT

## 2025-05-30 ENCOUNTER — OFFICE VISIT (OUTPATIENT)
Dept: URBAN - METROPOLITAN AREA CLINIC 107 | Facility: CLINIC | Age: 40
End: 2025-05-30
Payer: COMMERCIAL

## 2025-05-30 DIAGNOSIS — R19.4 CHANGE IN BOWEL HABITS: ICD-10-CM

## 2025-05-30 DIAGNOSIS — R10.11 RUQ PAIN: ICD-10-CM

## 2025-05-30 DIAGNOSIS — D51.0 PERNICIOUS ANEMIA: ICD-10-CM

## 2025-05-30 DIAGNOSIS — E53.8 B12 DEFICIENCY: ICD-10-CM

## 2025-05-30 DIAGNOSIS — K58.1 IRRITABLE BOWEL SYNDROME WITH CONSTIPATION: ICD-10-CM

## 2025-05-30 DIAGNOSIS — K21.9 GASTROESOPHAGEAL REFLUX DISEASE, UNSPECIFIED WHETHER ESOPHAGITIS PRESENT: ICD-10-CM

## 2025-05-30 DIAGNOSIS — R74.8 ABNORMAL AST AND ALT: ICD-10-CM

## 2025-05-30 PROCEDURE — 99214 OFFICE O/P EST MOD 30 MIN: CPT

## 2025-05-30 RX ORDER — ONDANSETRON HYDROCHLORIDE 4 MG/1
TABLET, FILM COATED ORAL
Qty: 20 EACH | Refills: 0 | Status: ON HOLD | COMMUNITY

## 2025-05-30 RX ORDER — LEVOTHYROXINE SODIUM 25 UG/1
1 TABLET IN THE MORNING ON AN EMPTY STOMACH TABLET ORAL ONCE A DAY
Status: ON HOLD | COMMUNITY

## 2025-05-30 RX ORDER — DEXTROAMPHETAMINE SULFATE, DEXTROAMPHETAMINE SACCHARATE, AMPHETAMINE SULFATE AND AMPHETAMINE ASPARTATE 7.5; 7.5; 7.5; 7.5 MG/1; MG/1; MG/1; MG/1
CAPSULE, EXTENDED RELEASE ORAL
Qty: 30 CAPSULE | Status: ON HOLD | COMMUNITY

## 2025-05-30 RX ORDER — OMEPRAZOLE 20 MG/1
2 CAPSULE 30 MINUTES BEFORE MORNING MEAL CAPSULE, DELAYED RELEASE ORAL ONCE A DAY
Refills: 3 | Status: ACTIVE | COMMUNITY
Start: 2024-03-20

## 2025-05-30 RX ORDER — DEXTROAMPHETAMINE SULFATE, DEXTROAMPHETAMINE SACCHARATE, AMPHETAMINE ASPARTATE MONOHYDRATE, AND AMPHETAMINE SULFATE 12.5; 12.5; 12.5; 12.5 MG/1; MG/1; MG/1; MG/1
1 CAPSULE IN THE MORNING CAPSULE, EXTENDED RELEASE ORAL ONCE A DAY
Status: ACTIVE | COMMUNITY

## 2025-05-30 RX ORDER — LUBIPROSTONE 24 UG/1
1 CAPSULE CAPSULE, GELATIN COATED ORAL TWICE A DAY
Qty: 180 CAPSULE | Refills: 3 | Status: ON HOLD | COMMUNITY
Start: 2024-06-25 | End: 2025-06-20

## 2025-05-30 RX ORDER — BUSPIRONE HYDROCHLORIDE 7.5 MG/1
1 TABLET TABLET ORAL TWICE A DAY
Status: ACTIVE | COMMUNITY

## 2025-05-30 RX ORDER — AMITRIPTYLINE HYDROCHLORIDE 25 MG/1
TAKE 1 TABLET BY MOUTH DAILY AT BEDTIME TABLET, FILM COATED ORAL
Qty: 30 TABLET | Refills: 8 | Status: ON HOLD | COMMUNITY

## 2025-05-30 RX ORDER — LINACLOTIDE 145 UG/1
1 CAPSULE AT LEAST 30 MINUTES BEFORE THE FIRST MEAL OF THE DAY ON AN EMPTY STOMACH CAPSULE, GELATIN COATED ORAL ONCE A DAY
Qty: 90 | Refills: 3 | Status: ACTIVE | COMMUNITY

## 2025-05-30 RX ORDER — DICYCLOMINE HYDROCHLORIDE 10 MG/1
CAPSULE ORAL
Qty: 20 CAPSULE | Status: ON HOLD | COMMUNITY

## 2025-05-30 RX ORDER — BUPROPION HYDROCHLORIDE 200 MG/1
1 TABLET IN THE MORNING TABLET, FILM COATED ORAL ONCE A DAY
Status: ACTIVE | COMMUNITY

## 2025-05-30 RX ORDER — ESCITALOPRAM OXALATE 20 MG/1
1 AND 1/2 PILLS TABLET, FILM COATED ORAL ONCE A DAY
Status: ON HOLD | COMMUNITY

## 2025-05-30 RX ORDER — IBUPROFEN 800 MG/1
TABLET ORAL
Qty: 30 TABLET | Status: ACTIVE | COMMUNITY

## 2025-05-30 RX ORDER — CLONAZEPAM 0.5 MG/1
TABLET ORAL
Qty: 30 TABLET | Status: ON HOLD | COMMUNITY

## 2025-05-30 RX ORDER — LEVOTHYROXINE, LIOTHYRONINE 19; 4.5 UG/1; UG/1
1 TABLET ON AN EMPTY STOMACH TABLET ORAL ONCE A DAY
Status: ACTIVE | COMMUNITY

## 2025-05-30 RX ORDER — CYANOCOBALAMIN 1000 UG/ML
1 ML INJECTION INTRAMUSCULAR; SUBCUTANEOUS
Qty: 3 | Refills: 3
Start: 2020-12-08

## 2025-05-30 RX ORDER — PLECANATIDE 3 MG/1
1 TABLET TABLET ORAL ONCE A DAY
Qty: 90 | Refills: 3 | OUTPATIENT
Start: 2025-05-30 | End: 2026-05-25

## 2025-05-30 RX ORDER — DEXTROAMPHETAMINE SACCHARATE, AMPHETAMINE ASPARTATE MONOHYDRATE, DEXTROAMPHETAMINE SULFATE, AND AMPHETAMINE SULFATE 5; 5; 5; 5 MG/1; MG/1; MG/1; MG/1
CAPSULE, EXTENDED RELEASE ORAL
Qty: 60 CAPSULE | Status: ON HOLD | COMMUNITY

## 2025-05-30 RX ORDER — ATENOLOL 25 MG/1
TABLET ORAL
Qty: 90 EACH | Refills: 3 | Status: ACTIVE | COMMUNITY

## 2025-05-30 NOTE — HPI-TODAY'S VISIT:
The patient is a very pleasant 35-year-old female who I seen in the past for abnormal LFTs and cholelithiasis.  She underwent cholecystectomy.  Work-up for abnormal LFTs did show an abnormal anti-smooth muscle antibody but her liver tests actually went to normal with weight loss on a keto diet.  Her last liver tests 1 year ago were normal.  She is now 21 weeks pregnant.  She states recently she started developing intermittent right upper quadrant pain primarily in a postprandial pattern.  It can be quite severe at times.  She is also worried as to whether she has developed severe B12 deficiency.  She has a long history of pernicious anemia with B12 deficiency.  She states she has not had a B12 shot in 1 year.  She states she started to have some of the nail changes and other physical changes that she has had with B12 deficiency in the past.  B12 level did come back markedly low at just over 200.  ALT was minimally elevated but other LFTs were normal.  She is receiving B12 shots and states her B12 level was recently checked and was approximately 350.  She states the right upper quadrant pain has indicated but she now has a probable waking up between midnight and 4 AM with severe heartburn and regurgitation all the way to the back of her throat often causing coughing.  There is severe sour taste to it.  She started over-the-counter Nexium and this has provided significant improvement but she still has some symptoms.  She states her obstetrician recently checked LFTs and they were normal. Interval history, 3/10/2023.  Referring records were reviewed.  Laboratory testing from  of this month revealed a normal CBC.  CMP was also normal.  Of note back in August she did have a low hemoglobin of 8.9. Last upper endoscopy was 2016 for follow-up of gastric polyps.  This was an otherwise unremarkable exam.  Biopsies at that time were unremarkable with no evidence for H. pylori. 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  In the interim she became pregnant again and now has a young child.  She states the reason she was in the emergency room the other day she has been evaluated for episodes of syncope that tend to come on during stress.  She has no gastrointestinal complaints at this time.  She states she has been off B12 shots for a year.  She states most recent B12 level was down to 500. Interval history, 3/20/2024.  Laboratory testing from 2023 revealed a normal intrinsic factor antibody level. The patient states that since I last saw her she was started on a GLP-1 blocker and this led to severe gastrointestinal complaints.  She states also her psychiatrist has been working on changing her medications and in fact put her through a medication holiday.  She is thought to have PTSD related to the death of her second child.  She admits that she has been noncompliant with fiber and with her B12 shots. She states she was recently in the [facility] ER for the symptomatologies and had blood work and a CT scan performed which we will obtain those results. Interval history, 6/3/2024.  Outside emergency room records from February were reviewed.  This revealed a normal CBC, normal CMP, CT scan of the abdomen and pelvis with contrast which was unremarkable. The patient states that the amitriptyline was only minimally effective.  She still continues to have a pattern of bloating developing a sense of constipation with no bowel movements and then a "blowout" with multiple bowel movements that are quite muddy.  She admits that she has been somewhat noncompliant with her B12 shots.  She has a couple still left at home.  She states she still continues to have severe problems of anxiety depression and PTSD issues with fighter flight issues.  She is working with her psychiatrist closely to try and improve the symptomatology. Interval history, 2024. The patient continues to have the symptoms of erratic bowel movements.  She does state that she has fear of having bowel movements at school if she does not wish to use the children's bathrooms.  She is taking a fiber supplement with some marginal improvement at best.  Interval history, 2025:  Colonoscopy performed in 2024 for change in bowel habits revealed excellent bowel prep, normal TI and colon.  No specimens were collected.  She is recommended repeat screening in 10 years.    She presents today with complaints of an IBS flare and anemia.  She had a bilateral salpingectomy on 3/18/2024.  She states she had her GB removed ten years ago. SHe was having postprandial urgency several weeks ago. She made the appt for this reason. Since then, this has subsided and she has returned to constipation predominant bowel habits. She has not had BM in 7-8 days. SHe has increased her fiber and water. Miralax makes her feel "bleh." SHe consumed popcorn last night otherwise has been following a low carb, keto diet. She is out of her B12 injections. She is requesting a refill. She states levels have not been checked in some time.

## 2025-06-03 ENCOUNTER — TELEPHONE ENCOUNTER (OUTPATIENT)
Dept: URBAN - METROPOLITAN AREA CLINIC 113 | Facility: CLINIC | Age: 40
End: 2025-06-03

## 2025-06-04 ENCOUNTER — P2P PATIENT RECORD (OUTPATIENT)
Age: 40
End: 2025-06-04

## 2025-06-05 ENCOUNTER — TELEPHONE ENCOUNTER (OUTPATIENT)
Dept: URBAN - METROPOLITAN AREA CLINIC 113 | Facility: CLINIC | Age: 40
End: 2025-06-05

## 2025-07-29 ENCOUNTER — OFFICE VISIT (OUTPATIENT)
Dept: URBAN - METROPOLITAN AREA CLINIC 107 | Facility: CLINIC | Age: 40
End: 2025-07-29
Payer: COMMERCIAL

## 2025-07-29 DIAGNOSIS — K58.1 IRRITABLE BOWEL SYNDROME WITH CONSTIPATION: ICD-10-CM

## 2025-07-29 DIAGNOSIS — R10.11 RUQ PAIN: ICD-10-CM

## 2025-07-29 DIAGNOSIS — K21.9 GASTROESOPHAGEAL REFLUX DISEASE, UNSPECIFIED WHETHER ESOPHAGITIS PRESENT: ICD-10-CM

## 2025-07-29 DIAGNOSIS — R74.8 ABNORMAL AST AND ALT: ICD-10-CM

## 2025-07-29 DIAGNOSIS — D51.0 PERNICIOUS ANEMIA: ICD-10-CM

## 2025-07-29 PROCEDURE — 99213 OFFICE O/P EST LOW 20 MIN: CPT | Performed by: INTERNAL MEDICINE

## 2025-07-29 RX ORDER — LEVOTHYROXINE, LIOTHYRONINE 19; 4.5 UG/1; UG/1
1 TABLET ON AN EMPTY STOMACH TABLET ORAL ONCE A DAY
Status: ACTIVE | COMMUNITY

## 2025-07-29 RX ORDER — ONDANSETRON HYDROCHLORIDE 4 MG/1
TABLET, FILM COATED ORAL
Qty: 20 EACH | Refills: 0 | Status: ON HOLD | COMMUNITY

## 2025-07-29 RX ORDER — CLONAZEPAM 0.5 MG/1
TABLET ORAL
Qty: 30 TABLET | Status: ON HOLD | COMMUNITY

## 2025-07-29 RX ORDER — IBUPROFEN 800 MG/1
TABLET ORAL
Qty: 30 TABLET | Status: ACTIVE | COMMUNITY

## 2025-07-29 RX ORDER — PLECANATIDE 3 MG/1
1 TABLET TABLET ORAL ONCE A DAY
Qty: 90 | Refills: 3 | Status: ACTIVE | COMMUNITY
Start: 2025-05-30 | End: 2026-05-25

## 2025-07-29 RX ORDER — LEVOTHYROXINE SODIUM 25 UG/1
1 TABLET IN THE MORNING ON AN EMPTY STOMACH TABLET ORAL ONCE A DAY
Status: ON HOLD | COMMUNITY

## 2025-07-29 RX ORDER — DEXTROAMPHETAMINE SULFATE, DEXTROAMPHETAMINE SACCHARATE, AMPHETAMINE SULFATE AND AMPHETAMINE ASPARTATE 7.5; 7.5; 7.5; 7.5 MG/1; MG/1; MG/1; MG/1
CAPSULE, EXTENDED RELEASE ORAL
Qty: 30 CAPSULE | Status: ON HOLD | COMMUNITY

## 2025-07-29 RX ORDER — BUPROPION HYDROCHLORIDE 200 MG/1
1 TABLET IN THE MORNING TABLET, FILM COATED ORAL ONCE A DAY
Status: ACTIVE | COMMUNITY

## 2025-07-29 RX ORDER — DEXTROAMPHETAMINE SULFATE, DEXTROAMPHETAMINE SACCHARATE, AMPHETAMINE ASPARTATE MONOHYDRATE, AND AMPHETAMINE SULFATE 12.5; 12.5; 12.5; 12.5 MG/1; MG/1; MG/1; MG/1
1 CAPSULE IN THE MORNING CAPSULE, EXTENDED RELEASE ORAL ONCE A DAY
Status: ACTIVE | COMMUNITY

## 2025-07-29 RX ORDER — CYANOCOBALAMIN 1000 UG/ML
1 ML INJECTION INTRAMUSCULAR; SUBCUTANEOUS
Qty: 3 | Refills: 3 | Status: ACTIVE | COMMUNITY
Start: 2020-12-08

## 2025-07-29 RX ORDER — DICYCLOMINE HYDROCHLORIDE 10 MG/1
CAPSULE ORAL
Qty: 20 CAPSULE | Status: ON HOLD | COMMUNITY

## 2025-07-29 RX ORDER — OMEPRAZOLE 20 MG/1
2 CAPSULE 30 MINUTES BEFORE MORNING MEAL CAPSULE, DELAYED RELEASE ORAL ONCE A DAY
Refills: 3 | Status: ACTIVE | COMMUNITY
Start: 2024-03-20

## 2025-07-29 RX ORDER — BUSPIRONE HYDROCHLORIDE 7.5 MG/1
1 TABLET TABLET ORAL TWICE A DAY
Status: ACTIVE | COMMUNITY

## 2025-07-29 RX ORDER — DEXTROAMPHETAMINE SACCHARATE, AMPHETAMINE ASPARTATE MONOHYDRATE, DEXTROAMPHETAMINE SULFATE, AND AMPHETAMINE SULFATE 5; 5; 5; 5 MG/1; MG/1; MG/1; MG/1
CAPSULE, EXTENDED RELEASE ORAL
Qty: 60 CAPSULE | Status: ON HOLD | COMMUNITY

## 2025-07-29 RX ORDER — ESCITALOPRAM OXALATE 20 MG/1
1 AND 1/2 PILLS TABLET, FILM COATED ORAL ONCE A DAY
Status: ON HOLD | COMMUNITY

## 2025-07-29 RX ORDER — ATENOLOL 25 MG/1
TABLET ORAL
Qty: 90 EACH | Refills: 3 | Status: ACTIVE | COMMUNITY

## 2025-07-29 RX ORDER — LINACLOTIDE 145 UG/1
1 CAPSULE AT LEAST 30 MINUTES BEFORE THE FIRST MEAL OF THE DAY ON AN EMPTY STOMACH CAPSULE, GELATIN COATED ORAL ONCE A DAY
Qty: 90 | Refills: 3 | Status: ACTIVE | COMMUNITY

## 2025-07-29 RX ORDER — AMITRIPTYLINE HYDROCHLORIDE 25 MG/1
TAKE 1 TABLET BY MOUTH DAILY AT BEDTIME TABLET, FILM COATED ORAL
Qty: 30 TABLET | Refills: 8 | Status: ON HOLD | COMMUNITY

## 2025-07-29 NOTE — HPI-TODAY'S VISIT:
The patient is a very pleasant 35-year-old female who I seen in the past for abnormal LFTs and cholelithiasis.  She underwent cholecystectomy.  Work-up for abnormal LFTs did show an abnormal anti-smooth muscle antibody but her liver tests actually went to normal with weight loss on a keto diet.  Her last liver tests 1 year ago were normal.  She is now 21 weeks pregnant.  She states recently she started developing intermittent right upper quadrant pain primarily in a postprandial pattern.  It can be quite severe at times.  She is also worried as to whether she has developed severe B12 deficiency.  She has a long history of pernicious anemia with B12 deficiency.  She states she has not had a B12 shot in 1 year.  She states she started to have some of the nail changes and other physical changes that she has had with B12 deficiency in the past.  B12 level did come back markedly low at just over 200.  ALT was minimally elevated but other LFTs were normal.  She is receiving B12 shots and states her B12 level was recently checked and was approximately 350.  She states the right upper quadrant pain has indicated but she now has a probable waking up between midnight and 4 AM with severe heartburn and regurgitation all the way to the back of her throat often causing coughing.  There is severe sour taste to it.  She started over-the-counter Nexium and this has provided significant improvement but she still has some symptoms.  She states her obstetrician recently checked LFTs and they were normal. Interval history, 3/10/2023.  Referring records were reviewed.  Laboratory testing from  of this month revealed a normal CBC.  CMP was also normal.  Of note back in August she did have a low hemoglobin of 8.9. Last upper endoscopy was 2016 for follow-up of gastric polyps.  This was an otherwise unremarkable exam.  Biopsies at that time were unremarkable with no evidence for H. pylori. 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  In the interim she became pregnant again and now has a young child.  She states the reason she was in the emergency room the other day she has been evaluated for episodes of syncope that tend to come on during stress.  She has no gastrointestinal complaints at this time.  She states she has been off B12 shots for a year.  She states most recent B12 level was down to 500. Interval history, 3/20/2024.  Laboratory testing from 2023 revealed a normal intrinsic factor antibody level. The patient states that since I last saw her she was started on a GLP-1 blocker and this led to severe gastrointestinal complaints.  She states also her psychiatrist has been working on changing her medications and in fact put her through a medication holiday.  She is thought to have PTSD related to the death of her second child.  She admits that she has been noncompliant with fiber and with her B12 shots. She states she was recently in the [facility] ER for the symptomatologies and had blood work and a CT scan performed which we will obtain those results. Interval history, 6/3/2024.  Outside emergency room records from February were reviewed.  This revealed a normal CBC, normal CMP, CT scan of the abdomen and pelvis with contrast which was unremarkable. The patient states that the amitriptyline was only minimally effective.  She still continues to have a pattern of bloating developing a sense of constipation with no bowel movements and then a "blowout" with multiple bowel movements that are quite muddy.  She admits that she has been somewhat noncompliant with her B12 shots.  She has a couple still left at home.  She states she still continues to have severe problems of anxiety depression and PTSD issues with fighter flight issues.  She is working with her psychiatrist closely to try and improve the symptomatology. Interval history, 2024. The patient continues to have the symptoms of erratic bowel movements.  She does state that she has fear of having bowel movements at school if she does not wish to use the children's bathrooms.  She is taking a fiber supplement with some marginal improvement at best.  Interval history, 2025:  Colonoscopy performed in 2024 for change in bowel habits revealed excellent bowel prep, normal TI and colon.  No specimens were collected.  She is recommended repeat screening in 10 years.    She presents today with complaints of an IBS flare and anemia.  She had a bilateral salpingectomy on 3/18/2024.  She states she had her GB removed ten years ago. SHe was having postprandial urgency several weeks ago. She made the appt for this reason. Since then, this has subsided and she has returned to constipation predominant bowel habits. She has not had BM in 7-8 days. SHe has increased her fiber and water. Miralax makes her feel "bleh." SHe consumed popcorn last night otherwise has been following a low carb, keto diet. She is out of her B12 injections. She is requesting a refill. She states levels have not been checked in some time. Interval history, 2025. Laboratory testing from  of last month revealed a normal CMP, normal CBC, B12 level at the low end of normal at 296. The patient states she never gave the Trulance trial as of yet.  She states she started back on the B12 shots.  She states her most recent B12 level was over 600.  For the most part she does not feel constipated.  She usually has a bowel movement every couple days.  She does have occasional crampiness and urgency.  This can be variable as far as time and foods.